# Patient Record
Sex: FEMALE | Race: WHITE | NOT HISPANIC OR LATINO
[De-identification: names, ages, dates, MRNs, and addresses within clinical notes are randomized per-mention and may not be internally consistent; named-entity substitution may affect disease eponyms.]

---

## 2020-07-06 ENCOUNTER — DASHBOARD ENCOUNTERS (OUTPATIENT)
Age: 50
End: 2020-07-06

## 2020-07-06 ENCOUNTER — OFFICE VISIT (OUTPATIENT)
Dept: URBAN - METROPOLITAN AREA CLINIC 40 | Facility: CLINIC | Age: 50
End: 2020-07-06
Payer: COMMERCIAL

## 2020-07-06 ENCOUNTER — LAB OUTSIDE AN ENCOUNTER (OUTPATIENT)
Dept: URBAN - METROPOLITAN AREA CLINIC 40 | Facility: CLINIC | Age: 50
End: 2020-07-06

## 2020-07-06 DIAGNOSIS — R10.13 DYSPEPSIA: ICD-10-CM

## 2020-07-06 DIAGNOSIS — R10.12 LUQ PAIN: ICD-10-CM

## 2020-07-06 PROCEDURE — G8427 DOCREV CUR MEDS BY ELIG CLIN: HCPCS | Performed by: INTERNAL MEDICINE

## 2020-07-06 PROCEDURE — G8417 CALC BMI ABV UP PARAM F/U: HCPCS | Performed by: INTERNAL MEDICINE

## 2020-07-06 PROCEDURE — G9903 PT SCRN TBCO ID AS NON USER: HCPCS | Performed by: INTERNAL MEDICINE

## 2020-07-06 PROCEDURE — 99214 OFFICE O/P EST MOD 30 MIN: CPT | Performed by: INTERNAL MEDICINE

## 2020-07-06 PROCEDURE — 3017F COLORECTAL CA SCREEN DOC REV: CPT | Performed by: INTERNAL MEDICINE

## 2020-07-06 RX ORDER — MELOXICAM 7.5 MG/1
1 TABLET TABLET ORAL ONCE A DAY
Status: ACTIVE | COMMUNITY

## 2020-07-06 RX ORDER — MELOXICAM 7.5 MG/1
1 TABLET TABLET ORAL ONCE A DAY
OUTPATIENT

## 2020-07-06 RX ORDER — SUCRALFATE 1 G/1
1 TABLET ON AN EMPTY STOMACH TABLET ORAL TWICE A DAY
Qty: 180 TABLET | Refills: 1 | OUTPATIENT
Start: 2020-07-06 | End: 2021-01-01

## 2020-07-06 RX ORDER — PANTOPRAZOLE SODIUM 40 MG/1
TAKE 1 TABLET BY MOUTH EVERY DAY TABLET, DELAYED RELEASE ORAL
Qty: 30 | Refills: 0 | Status: DISCONTINUED | COMMUNITY
Start: 2020-01-07

## 2020-07-06 RX ORDER — ERGOCALCIFEROL 1.25 MG/1
CAPSULE ORAL
Qty: 0 | Refills: 0 | Status: DISCONTINUED | COMMUNITY
Start: 1900-01-01

## 2020-07-06 RX ORDER — GAUZE BANDAGE 4" X 4"
1 CAPSULE BANDAGE TOPICAL ONCE A DAY
Status: ACTIVE | COMMUNITY

## 2020-07-06 RX ORDER — CHROMIUM 200 MCG
TABLET ORAL
Qty: 0 | Refills: 0 | Status: ACTIVE | COMMUNITY
Start: 1900-01-01

## 2020-07-06 RX ORDER — PANTOPRAZOLE SODIUM 40 MG/1
1 TABLET TABLET, DELAYED RELEASE ORAL ONCE A DAY
Qty: 90 | Refills: 0 | OUTPATIENT
Start: 2020-07-06

## 2020-07-06 NOTE — HPI-TODAY'S VISIT:
Ms. Diaz presents for follow-up.  She was last seen July 2019.  At that point she was doing fairly well on pantoprazole for acid reflux.  Patient presents stating for the last 7 days she has had burning pain in the left upper quadrant.  The pain is worse after she eats and drinks.  She also notes nausea and increased burping after meals.  She has been using aloe vera and honey and this has

## 2020-07-06 NOTE — PHYSICAL EXAM GASTROINTESTINAL
Abdomen , soft, mild upper,abdominal tenderness bilaterally nondistended , no guarding or rigidity , no masses palpable , normal bowel sounds , Liver and Spleen , no hepatomegaly present , no hepatosplenomegaly , liver nontender , spleen not palpable

## 2020-07-09 ENCOUNTER — OFFICE VISIT (OUTPATIENT)
Dept: URBAN - METROPOLITAN AREA SURGERY CENTER 30 | Facility: SURGERY CENTER | Age: 50
End: 2020-07-09
Payer: COMMERCIAL

## 2020-07-09 DIAGNOSIS — K22.8 COLUMNAR-LINED ESOPHAGUS: ICD-10-CM

## 2020-07-09 DIAGNOSIS — K29.30 CHRONIC SUPERFICIAL GASTRITIS: ICD-10-CM

## 2020-07-09 PROCEDURE — 43239 EGD BIOPSY SINGLE/MULTIPLE: CPT | Performed by: INTERNAL MEDICINE

## 2020-07-09 PROCEDURE — G8907 PT DOC NO EVENTS ON DISCHARG: HCPCS | Performed by: INTERNAL MEDICINE

## 2020-07-22 ENCOUNTER — OFFICE VISIT (OUTPATIENT)
Dept: URBAN - METROPOLITAN AREA CLINIC 40 | Facility: CLINIC | Age: 50
End: 2020-07-22

## 2020-08-19 ENCOUNTER — ERX REFILL RESPONSE (OUTPATIENT)
Dept: URBAN - METROPOLITAN AREA CLINIC 40 | Facility: CLINIC | Age: 50
End: 2020-08-19

## 2020-08-19 RX ORDER — PANTOPRAZOLE SODIUM 40 MG/1
1 TABLET TABLET, DELAYED RELEASE ORAL ONCE A DAY
Qty: 90 | Refills: 0

## 2020-11-23 ENCOUNTER — ERX REFILL RESPONSE (OUTPATIENT)
Dept: URBAN - METROPOLITAN AREA CLINIC 40 | Facility: CLINIC | Age: 50
End: 2020-11-23

## 2020-11-23 RX ORDER — PANTOPRAZOLE SODIUM 40 MG/1
1 TABLET TABLET, DELAYED RELEASE ORAL ONCE A DAY
Qty: 30 | Refills: 1

## 2020-12-31 ENCOUNTER — ERX REFILL RESPONSE (OUTPATIENT)
Dept: URBAN - METROPOLITAN AREA CLINIC 40 | Facility: CLINIC | Age: 50
End: 2020-12-31

## 2020-12-31 RX ORDER — PANTOPRAZOLE SODIUM 40 MG/1
1 TABLET TABLET, DELAYED RELEASE ORAL ONCE A DAY
Qty: 30 | Refills: 0

## 2021-01-04 ENCOUNTER — ERX REFILL RESPONSE (OUTPATIENT)
Dept: URBAN - METROPOLITAN AREA CLINIC 40 | Facility: CLINIC | Age: 51
End: 2021-01-04

## 2021-01-04 RX ORDER — SUCRALFATE 1 G/1
1 TABLET ON AN EMPTY STOMACH TABLET ORAL TWICE A DAY
Qty: 60 | Refills: 0

## 2024-05-06 ENCOUNTER — P2P PATIENT RECORD (OUTPATIENT)
Age: 54
End: 2024-05-06

## 2024-05-16 ENCOUNTER — WEB ENCOUNTER (OUTPATIENT)
Dept: URBAN - METROPOLITAN AREA CLINIC 40 | Facility: CLINIC | Age: 54
End: 2024-05-16

## 2024-05-29 ENCOUNTER — WEB ENCOUNTER (OUTPATIENT)
Dept: URBAN - METROPOLITAN AREA CLINIC 40 | Facility: CLINIC | Age: 54
End: 2024-05-29

## 2024-06-12 ENCOUNTER — OUT OF OFFICE VISIT (OUTPATIENT)
Dept: URBAN - METROPOLITAN AREA SURGERY CENTER 30 | Facility: SURGERY CENTER | Age: 54
End: 2024-06-12
Payer: COMMERCIAL

## 2024-06-12 DIAGNOSIS — D12.7 ADENOMA DETERMINED BY COLORECTAL BIOPSY: ICD-10-CM

## 2024-06-12 DIAGNOSIS — D12.5 ADENOMA OF SIGMOID COLON: ICD-10-CM

## 2024-06-12 DIAGNOSIS — Z86.010 ADENOMAS PERSONAL HISTORY OF COLONIC POLYPS: ICD-10-CM

## 2024-06-12 DIAGNOSIS — Z09 CARDIOLOGY FOLLOW-UP ENCOUNTER: ICD-10-CM

## 2024-06-12 DIAGNOSIS — F41.9 ACUTE ANXIETY: ICD-10-CM

## 2024-06-12 PROCEDURE — 45385 COLONOSCOPY W/LESION REMOVAL: CPT | Performed by: INTERNAL MEDICINE

## 2024-06-12 PROCEDURE — 00811 ANES LWR INTST NDSC NOS: CPT | Performed by: NURSE ANESTHETIST, CERTIFIED REGISTERED

## 2024-09-09 PROBLEM — 197321007: Status: ACTIVE | Noted: 2024-09-09

## 2024-09-09 PROBLEM — 8493009: Status: ACTIVE | Noted: 2024-09-09

## 2024-09-20 ENCOUNTER — LAB OUTSIDE AN ENCOUNTER (OUTPATIENT)
Dept: URBAN - METROPOLITAN AREA CLINIC 74 | Facility: CLINIC | Age: 54
End: 2024-09-20

## 2024-09-20 ENCOUNTER — OFFICE VISIT (OUTPATIENT)
Dept: URBAN - METROPOLITAN AREA CLINIC 74 | Facility: CLINIC | Age: 54
End: 2024-09-20
Payer: COMMERCIAL

## 2024-09-20 VITALS
HEIGHT: 62 IN | BODY MASS INDEX: 35.55 KG/M2 | TEMPERATURE: 97.7 F | WEIGHT: 193.2 LBS | DIASTOLIC BLOOD PRESSURE: 86 MMHG | SYSTOLIC BLOOD PRESSURE: 126 MMHG | HEART RATE: 84 BPM

## 2024-09-20 DIAGNOSIS — R10.11 RUQ ABDOMINAL PAIN: ICD-10-CM

## 2024-09-20 DIAGNOSIS — Z86.010 PERSONAL HISTORY OF COLONIC POLYPS: ICD-10-CM

## 2024-09-20 DIAGNOSIS — R14.0 BLOATING SYMPTOM: ICD-10-CM

## 2024-09-20 DIAGNOSIS — M94.0 ACUTE COSTOCHONDRITIS: ICD-10-CM

## 2024-09-20 DIAGNOSIS — K29.50 MILD CHRONIC GASTRITIS: ICD-10-CM

## 2024-09-20 DIAGNOSIS — R74.01 TRANSAMINITIS: ICD-10-CM

## 2024-09-20 DIAGNOSIS — K44.9 HIATAL HERNIA: ICD-10-CM

## 2024-09-20 DIAGNOSIS — K76.0 HEPATIC STEATOSIS: ICD-10-CM

## 2024-09-20 PROBLEM — 428283002: Status: ACTIVE | Noted: 2024-09-20

## 2024-09-20 PROCEDURE — 99214 OFFICE O/P EST MOD 30 MIN: CPT | Performed by: PHYSICIAN ASSISTANT

## 2024-09-20 RX ORDER — CHROMIUM 200 MCG
TABLET ORAL
Qty: 0 | Refills: 0 | Status: ON HOLD | COMMUNITY
Start: 1900-01-01

## 2024-09-20 RX ORDER — DICYCLOMINE HYDROCHLORIDE 20 MG/1
1 TABLET TABLET ORAL THREE TIMES A DAY
Qty: 90 | Refills: 3 | OUTPATIENT
Start: 2024-09-20 | End: 2025-01-17

## 2024-09-20 RX ORDER — GAUZE BANDAGE 4" X 4"
1 CAPSULE BANDAGE TOPICAL ONCE A DAY
Status: ON HOLD | COMMUNITY

## 2024-09-20 RX ORDER — PANTOPRAZOLE SODIUM 40 MG/1
1 TABLET TABLET, DELAYED RELEASE ORAL ONCE A DAY
Qty: 30 | Refills: 0 | Status: ON HOLD | COMMUNITY

## 2024-09-20 RX ORDER — SUCRALFATE 1 G/1
1 TABLET ON AN EMPTY STOMACH TABLET ORAL TWICE A DAY
Qty: 60 | Refills: 0 | Status: ACTIVE | COMMUNITY

## 2024-09-20 NOTE — HPI-TODAY'S VISIT:
The patient is 54-year-old female with past medical history as noted below known to Dr. Cobb is presenting to our clinic today with right flank and abdominal pain. She has RUQ abdominal pain for 3 months not asociated with nausea, vomiting, or change in bowel movement. Her pain changes with deep breathing and movements.   Diagnostic studies: -- CT scan of the abdomen and pelvis without IV contrast on 09/04/2024 noted hepatic asteatosis otherwise unremarkable study.  -- RUQ US on 08/12/2024 noted echogenic liver parenchyma suggesting underlying hepatic steatosis otherwise unremarkable study.  -- Labs on 08/28/2024 CMP with glucose 173, BUN 20, creatinine 0.7, , AST 37, ALT 64, and TB 0.5.  CBC with WBC 7.0, hemoglobin 17.1, hematocrit 50.2, and platelet 252.  Lipid panel with cholesterol 247, triglyceride 213, HDL 57, and .  Hemoglobin A1c 8.5.  TSH 1.60.  Procedures: -- Colonoscopy with polypectomy on 06/12/2024 by Dr. Cobb noted medium sized lipoma at the hepatic flexure. One 2 mm polyp in the sigmoid colon, removed with a cold biopsy forcep and removed with a cold snare.  Resected and retrieved. One 4 mm polyp at the rectosigmoid colon, removed with a cold snare.  Resected and retrieved.  Non-bleeding internal hemorrhoids.  Biopsy with tubular adenoma colon polyps repeat colonoscopy in 7 years for surveillance.  -- EGD with biopsy on 07/09/2020 by Dr. Cobb noted Z-line variable, 36 cm from incisors.  Small hiatal hernia.  Gastritis.  No gross lesion entire examined duodenum.  Biopsy duodenum with no significant histopathology.  No celiac's sprue noted.  Stomach with chronic active gastritis.  No H.pylori organism.  Esophagus with no significant abnormality.  No Lui's esophagus or eosinophilic esophagitis.

## 2024-09-20 NOTE — PHYSICAL EXAM CHEST:
chest wall non-tender, breathing is unlabored without accessory muscle use, normal breath sounds, right side costochondritis with tenderness over floating ribs

## 2024-10-11 ENCOUNTER — TELEPHONE ENCOUNTER (OUTPATIENT)
Dept: URBAN - METROPOLITAN AREA CLINIC 74 | Facility: CLINIC | Age: 54
End: 2024-10-11

## 2024-10-17 ENCOUNTER — OFFICE VISIT (OUTPATIENT)
Dept: URBAN - METROPOLITAN AREA CLINIC 74 | Facility: CLINIC | Age: 54
End: 2024-10-17
Payer: COMMERCIAL

## 2024-10-17 VITALS
OXYGEN SATURATION: 97 % | WEIGHT: 194.4 LBS | HEIGHT: 62 IN | BODY MASS INDEX: 35.77 KG/M2 | HEART RATE: 74 BPM | TEMPERATURE: 98.1 F | SYSTOLIC BLOOD PRESSURE: 126 MMHG | DIASTOLIC BLOOD PRESSURE: 84 MMHG

## 2024-10-17 DIAGNOSIS — K29.50 MILD CHRONIC GASTRITIS: ICD-10-CM

## 2024-10-17 DIAGNOSIS — K44.9 HIATAL HERNIA: ICD-10-CM

## 2024-10-17 DIAGNOSIS — E83.110 HEREDITARY HEMOCHROMATOSIS: ICD-10-CM

## 2024-10-17 DIAGNOSIS — K76.0 HEPATIC STEATOSIS: ICD-10-CM

## 2024-10-17 DIAGNOSIS — R74.01 TRANSAMINITIS: ICD-10-CM

## 2024-10-17 PROBLEM — 35400008: Status: ACTIVE | Noted: 2024-10-17

## 2024-10-17 PROCEDURE — 99213 OFFICE O/P EST LOW 20 MIN: CPT | Performed by: PHYSICIAN ASSISTANT

## 2024-10-17 RX ORDER — SUCRALFATE 1 G/1
1 TABLET ON AN EMPTY STOMACH TABLET ORAL TWICE A DAY
Qty: 60 | Refills: 0 | Status: ON HOLD | COMMUNITY

## 2024-10-17 RX ORDER — TRAZODONE HYDROCHLORIDE 50 MG/1
1 TABLET AT BEDTIME AS NEEDED TABLET ORAL ONCE A DAY
Status: ACTIVE | COMMUNITY

## 2024-10-17 RX ORDER — DICYCLOMINE HYDROCHLORIDE 20 MG/1
1 TABLET TABLET ORAL THREE TIMES A DAY
Qty: 90 | Refills: 3 | Status: ACTIVE | COMMUNITY
Start: 2024-09-20 | End: 2025-01-17

## 2024-10-17 NOTE — HPI-TODAY'S VISIT:
The patient is 54-year-old female with past medical history as noted below known to Dr. Cobb is presenting to our clinic today to discuss recent labs and imaging. She was given Dicyclomine 20 mg one tablet every 8 hours as needed. The patient was informed of her HIDA results and she was referred to Dr. Lorenzana on 10/11/2024. She saw Dr. Lorenzana on 10/16/2024 and she is scheduled for Gallbladder surgery on 10/21/2024. She is overall doing the same with some improvement on Dicyclomine. No other GI issues today.    Diagnsotic studies: -- HIDA scan on 10/11/2024 with abnormal gallbladder emptying study. Findings are consistent with either chronic cholecystitis or dyskinesia.  -- Labs on 10/01/2024 ASMA negative.  AMA negative.  LKM-1 antibody negative.  SLA autoantibody negative.  Alpha 1 antitrypsin 136.  Ceruloplasmin 29.  IgG G, IgM, and IgA unremarkable.  ANGELA negative.  Acute hepatitis panel undetectable. Fe panel with Fe 127, TIBC 388, % saturation 33, and Ferritin 86.  CMP with BUN 21, creatinine 0.68, ALP 98, AST 25, and ALT 49. FIB-4 INDEX 0.75. HHMA positive for one mutation H63D.  -- CT scan of the abdomen and pelvis without IV contrast on 09/04/2024 noted hepatic asteatosis otherwise unremarkable study.  -- RUQ US on 08/12/2024 noted echogenic liver parenchyma suggesting underlying hepatic steatosis otherwise unremarkable study.  -- Labs on 08/28/2024 CMP with glucose 173, BUN 20, creatinine 0.7, , AST 37, ALT 64, and TB 0.5.  CBC with WBC 7.0, hemoglobin 17.1, hematocrit 50.2, and platelet 252.  Lipid panel with cholesterol 247, triglyceride 213, HDL 57, and .  Hemoglobin A1c 8.5.  TSH 1.60.  Procedures: -- Colonoscopy with polypectomy on 06/12/2024 by Dr. Cobb noted medium sized lipoma at the hepatic flexure. One 2 mm polyp in the sigmoid colon, removed with a cold biopsy forcep and removed with a cold snare.  Resected and retrieved. One 4 mm polyp at the rectosigmoid colon, removed with a cold snare.  Resected and retrieved.  Non-bleeding internal hemorrhoids.  Biopsy with tubular adenoma colon polyps repeat colonoscopy in 7 years for surveillance.  -- EGD with biopsy on 07/09/2020 by Dr. Cobb noted Z-line variable, 36 cm from incisors.  Small hiatal hernia.  Gastritis.  No gross lesion entire examined duodenum.  Biopsy duodenum with no significant histopathology.  No celiac's sprue noted.  Stomach with chronic active gastritis.  No H.pylori organism.  Esophagus with no significant abnormality.  No Lui's esophagus or eosinophilic esophagitis.

## 2025-01-10 ENCOUNTER — OFFICE VISIT (OUTPATIENT)
Dept: URBAN - METROPOLITAN AREA TELEHEALTH 2 | Facility: TELEHEALTH | Age: 55
End: 2025-01-10
Payer: COMMERCIAL

## 2025-01-10 ENCOUNTER — TELEPHONE ENCOUNTER (OUTPATIENT)
Dept: URBAN - METROPOLITAN AREA CLINIC 74 | Facility: CLINIC | Age: 55
End: 2025-01-10

## 2025-01-10 DIAGNOSIS — K29.50 MILD CHRONIC GASTRITIS: ICD-10-CM

## 2025-01-10 DIAGNOSIS — R14.0 BLOATING SYMPTOM: ICD-10-CM

## 2025-01-10 DIAGNOSIS — K76.0 HEPATIC STEATOSIS: ICD-10-CM

## 2025-01-10 DIAGNOSIS — K44.9 HIATAL HERNIA: ICD-10-CM

## 2025-01-10 DIAGNOSIS — R74.01 TRANSAMINITIS: ICD-10-CM

## 2025-01-10 PROCEDURE — 99212 OFFICE O/P EST SF 10 MIN: CPT | Performed by: PHYSICIAN ASSISTANT

## 2025-01-10 PROCEDURE — 99442 PHONE E/M BY PHYS 11-20 MIN: CPT | Performed by: PHYSICIAN ASSISTANT

## 2025-01-10 RX ORDER — DICYCLOMINE HYDROCHLORIDE 20 MG/1
1 TABLET TABLET ORAL THREE TIMES A DAY
Qty: 270 TABLET | Refills: 3
Start: 2024-09-20 | End: 2026-01-05

## 2025-01-10 RX ORDER — SUCRALFATE 1 G/1
1 TABLET ON AN EMPTY STOMACH TABLET ORAL TWICE A DAY
Qty: 60 | Refills: 0 | Status: ON HOLD | COMMUNITY

## 2025-01-10 RX ORDER — TRAZODONE HYDROCHLORIDE 50 MG/1
1 TABLET AT BEDTIME AS NEEDED TABLET ORAL ONCE A DAY
Status: ACTIVE | COMMUNITY

## 2025-01-10 RX ORDER — DICYCLOMINE HYDROCHLORIDE 20 MG/1
1 TABLET TABLET ORAL THREE TIMES A DAY
Qty: 90 | Refills: 3 | Status: ACTIVE | COMMUNITY
Start: 2024-09-20 | End: 2025-01-17

## 2025-01-10 NOTE — HPI-TODAY'S VISIT:
The patient is 54-year-old female with past medical history as noted below known to Dr. Cobb has telemedicine appointment today for routine follow up appointment. She continues on Dicyclomine 20 mg one tablet every 8 hours as needed. She is S/P Gallbladder surgery in 10/2024. She is doing overall much better since her surgery. She denies any new GI issues except abdominal bloating and gas.   Diagnsotic studies: -- HIDA scan on 10/11/2024 with abnormal gallbladder emptying study. Findings are consistent with either chronic cholecystitis or dyskinesia.  -- Labs on 10/01/2024 ASMA negative.  AMA negative.  LKM-1 antibody negative.  SLA autoantibody negative.  Alpha 1 antitrypsin 136.  Ceruloplasmin 29.  IgG G, IgM, and IgA unremarkable.  ANGELA negative.  Acute hepatitis panel undetectable. Fe panel with Fe 127, TIBC 388, % saturation 33, and Ferritin 86.  CMP with BUN 21, creatinine 0.68, ALP 98, AST 25, and ALT 49. FIB-4 INDEX 0.75. HHMA positive for one mutation H63D.  -- CT scan of the abdomen and pelvis without IV contrast on 09/04/2024 noted hepatic asteatosis otherwise unremarkable study.  -- RUQ US on 08/12/2024 noted echogenic liver parenchyma suggesting underlying hepatic steatosis otherwise unremarkable study.  -- Labs on 08/28/2024 CMP with glucose 173, BUN 20, creatinine 0.7, , AST 37, ALT 64, and TB 0.5.  CBC with WBC 7.0, hemoglobin 17.1, hematocrit 50.2, and platelet 252.  Lipid panel with cholesterol 247, triglyceride 213, HDL 57, and .  Hemoglobin A1c 8.5.  TSH 1.60.  Procedures: -- Colonoscopy with polypectomy on 06/12/2024 by Dr. Cobb noted medium sized lipoma at the hepatic flexure. One 2 mm polyp in the sigmoid colon, removed with a cold biopsy forcep and removed with a cold snare.  Resected and retrieved. One 4 mm polyp at the rectosigmoid colon, removed with a cold snare.  Resected and retrieved.  Non-bleeding internal hemorrhoids.  Biopsy with tubular adenoma colon polyps repeat colonoscopy in 7 years for surveillance.  -- EGD with biopsy on 07/09/2020 by Dr. Cobb noted Z-line variable, 36 cm from incisors.  Small hiatal hernia.  Gastritis.  No gross lesion entire examined duodenum.  Biopsy duodenum with no significant histopathology.  No celiac's sprue noted.  Stomach with chronic active gastritis.  No H.pylori organism.  Esophagus with no significant abnormality.  No Lui's esophagus or eosinophilic esophagitis.

## 2025-01-16 ENCOUNTER — OFFICE VISIT (OUTPATIENT)
Dept: URBAN - METROPOLITAN AREA CLINIC 74 | Facility: CLINIC | Age: 55
End: 2025-01-16

## 2025-01-17 ENCOUNTER — OFFICE VISIT (OUTPATIENT)
Dept: URBAN - METROPOLITAN AREA CLINIC 74 | Facility: CLINIC | Age: 55
End: 2025-01-17

## 2025-01-21 ENCOUNTER — WEB ENCOUNTER (OUTPATIENT)
Dept: URBAN - METROPOLITAN AREA CLINIC 74 | Facility: CLINIC | Age: 55
End: 2025-01-21

## 2025-03-13 ENCOUNTER — OFFICE VISIT (OUTPATIENT)
Dept: URBAN - METROPOLITAN AREA CLINIC 74 | Facility: CLINIC | Age: 55
End: 2025-03-13

## 2025-03-13 RX ORDER — DICYCLOMINE HYDROCHLORIDE 20 MG/1
1 TABLET TABLET ORAL THREE TIMES A DAY
Qty: 270 TABLET | Refills: 3 | Status: ACTIVE | COMMUNITY
Start: 2024-09-20 | End: 2026-01-05

## 2025-03-13 RX ORDER — TRAZODONE HYDROCHLORIDE 50 MG/1
1 TABLET AT BEDTIME AS NEEDED TABLET ORAL ONCE A DAY
Status: ACTIVE | COMMUNITY

## 2025-03-13 RX ORDER — SUCRALFATE 1 G/1
1 TABLET ON AN EMPTY STOMACH TABLET ORAL TWICE A DAY
Qty: 60 | Refills: 0 | Status: ON HOLD | COMMUNITY

## 2025-03-13 NOTE — HPI-TODAY'S VISIT:
The patient is 54-year-old female with past medical history as noted below known to Dr. Cobb is presenting to our clinic today for routine follow up appointment. Did not complete SIBO test.  She continues on Dicyclomine 20 mg one tablet every 8 hours as needed.   Diagnsotic studies: -- Labs on 10/01/2024 ASMA negative.  AMA negative.  LKM-1 antibody negative.  SLA autoantibody negative.  Alpha 1 antitrypsin 136.  Ceruloplasmin 29.  IgG G, IgM, and IgA unremarkable.  ANGELA negative.  Acute hepatitis panel undetectable. Fe panel with Fe 127, TIBC 388, % saturation 33, and Ferritin 86.  CMP with BUN 21, creatinine 0.68, ALP 98, AST 25, and ALT 49. FIB-4 INDEX 0.75. HHMA positive for one mutation H63D.  -- CT scan of the abdomen and pelvis without IV contrast on 09/04/2024 noted hepatic steatosis otherwise unremarkable study.   Procedures: -- Colonoscopy with polypectomy on 06/12/2024 by Dr. Cobb noted medium sized lipoma at the hepatic flexure. One 2 mm polyp in the sigmoid colon, removed with a cold biopsy forcep and removed with a cold snare.  Resected and retrieved. One 4 mm polyp at the rectosigmoid colon, removed with a cold snare.  Resected and retrieved.  Non-bleeding internal hemorrhoids.  Biopsy with tubular adenoma colon polyps repeat colonoscopy in 7 years for surveillance.  -- EGD with biopsy on 07/09/2020 by Dr. Cobb noted Z-line variable, 36 cm from incisors.  Small hiatal hernia.  Gastritis.  No gross lesion entire examined duodenum.  Biopsy duodenum with no significant histopathology.  No celiac's sprue noted.  Stomach with chronic active gastritis.  No H.pylori organism.  Esophagus with no significant abnormality.  No Lui's esophagus or eosinophilic esophagitis.

## 2025-03-24 ENCOUNTER — OFFICE VISIT (OUTPATIENT)
Dept: URBAN - METROPOLITAN AREA CLINIC 74 | Facility: CLINIC | Age: 55
End: 2025-03-24

## 2025-03-24 RX ORDER — SUCRALFATE 1 G/1
1 TABLET ON AN EMPTY STOMACH TABLET ORAL TWICE A DAY
Qty: 60 | Refills: 0 | COMMUNITY

## 2025-03-24 RX ORDER — TRAZODONE HYDROCHLORIDE 50 MG/1
1 TABLET AT BEDTIME AS NEEDED TABLET ORAL ONCE A DAY
Status: ACTIVE | COMMUNITY

## 2025-03-24 RX ORDER — DICYCLOMINE HYDROCHLORIDE 20 MG/1
1 TABLET TABLET ORAL THREE TIMES A DAY
Qty: 270 TABLET | Refills: 3 | Status: ACTIVE | COMMUNITY
Start: 2024-09-20 | End: 2026-01-05

## 2025-03-24 NOTE — HPI-TODAY'S VISIT:
The patient is 54-year-old female with past medical history as noted below known to Dr. Cobb is presenting to our clinic today for routine follow up appointment. Did not complete SIBO test.  She continues on Dicyclomine 20 mg one tablet every 8 hours as needed.   Diagnsotic studies: -- SIBO test on 03/18/2025 undetectable.   -- Labs on 10/01/2024 ASMA negative.  AMA negative.  LKM-1 antibody negative.  SLA autoantibody negative.  Alpha 1 antitrypsin 136.  Ceruloplasmin 29.  IgG G, IgM, and IgA unremarkable.  ANGELA negative.  Acute hepatitis panel undetectable. Fe panel with Fe 127, TIBC 388, % saturation 33, and Ferritin 86.  CMP with BUN 21, creatinine 0.68, ALP 98, AST 25, and ALT 49. FIB-4 INDEX 0.75. HHMA positive for one mutation H63D.  -- CT scan of the abdomen and pelvis without IV contrast on 09/04/2024 noted hepatic steatosis otherwise unremarkable study.   Procedures: -- Colonoscopy with polypectomy on 06/12/2024 by Dr. Cobb noted medium sized lipoma at the hepatic flexure. One 2 mm polyp in the sigmoid colon, removed with a cold biopsy forcep and removed with a cold snare.  Resected and retrieved. One 4 mm polyp at the rectosigmoid colon, removed with a cold snare.  Resected and retrieved.  Non-bleeding internal hemorrhoids.  Biopsy with tubular adenoma colon polyps repeat colonoscopy in 7 years for surveillance.  -- EGD with biopsy on 07/09/2020 by Dr. Cobb noted Z-line variable, 36 cm from incisors.  Small hiatal hernia.  Gastritis.  No gross lesion entire examined duodenum.  Biopsy duodenum with no significant histopathology.  No celiac's sprue noted.  Stomach with chronic active gastritis.  No H.pylori organism.  Esophagus with no significant abnormality.  No Lui's esophagus or eosinophilic esophagitis.

## 2025-03-28 ENCOUNTER — OFFICE VISIT (OUTPATIENT)
Dept: URBAN - METROPOLITAN AREA CLINIC 74 | Facility: CLINIC | Age: 55
End: 2025-03-28
Payer: COMMERCIAL

## 2025-03-28 ENCOUNTER — LAB OUTSIDE AN ENCOUNTER (OUTPATIENT)
Dept: URBAN - METROPOLITAN AREA CLINIC 74 | Facility: CLINIC | Age: 55
End: 2025-03-28

## 2025-03-28 DIAGNOSIS — R74.01 TRANSAMINITIS: ICD-10-CM

## 2025-03-28 DIAGNOSIS — R14.0 BLOATING SYMPTOM: ICD-10-CM

## 2025-03-28 DIAGNOSIS — K76.0 HEPATIC STEATOSIS: ICD-10-CM

## 2025-03-28 DIAGNOSIS — Z79.899 LONG-TERM CURRENT USE OF PROTON PUMP INHIBITOR THERAPY: ICD-10-CM

## 2025-03-28 DIAGNOSIS — K29.50 MILD CHRONIC GASTRITIS: ICD-10-CM

## 2025-03-28 DIAGNOSIS — K21.00 CHRONIC REFLUX ESOPHAGITIS: ICD-10-CM

## 2025-03-28 DIAGNOSIS — R10.11 POSTPRANDIAL RUQ PAIN: ICD-10-CM

## 2025-03-28 PROBLEM — 266433003: Status: ACTIVE | Noted: 2025-03-28

## 2025-03-28 PROCEDURE — 99214 OFFICE O/P EST MOD 30 MIN: CPT | Performed by: PHYSICIAN ASSISTANT

## 2025-03-28 RX ORDER — DICYCLOMINE HYDROCHLORIDE 20 MG/1
1 TABLET TABLET ORAL THREE TIMES A DAY
Qty: 270 TABLET | Refills: 3 | Status: ACTIVE | COMMUNITY
Start: 2024-09-20 | End: 2026-01-05

## 2025-03-28 RX ORDER — TRAZODONE HYDROCHLORIDE 50 MG/1
1 TABLET AT BEDTIME AS NEEDED TABLET ORAL ONCE A DAY
Status: ACTIVE | COMMUNITY

## 2025-03-28 RX ORDER — PANTOPRAZOLE SODIUM 40 MG/1
1 TABLET 1/2 TO 1 HOUR BEFORE MORNING MEAL TABLET, DELAYED RELEASE ORAL ONCE A DAY
Qty: 90 TABLET | Refills: 1 | OUTPATIENT
Start: 2025-03-28

## 2025-03-28 RX ORDER — SUCRALFATE 1 G/1
1 TABLET ON AN EMPTY STOMACH TABLET ORAL TWICE A DAY
Qty: 60 | Refills: 0 | Status: ON HOLD | COMMUNITY

## 2025-03-28 RX ORDER — RIFAXIMIN 550 MG/1
1 TABLET TABLET ORAL THREE TIMES A DAY
Qty: 42 TABLET | Refills: 1 | OUTPATIENT
Start: 2025-03-28 | End: 2025-04-25

## 2025-03-28 NOTE — PHYSICAL EXAM GASTROINTESTINAL
Abdomen , soft, tender at RUQ  and epigastric abdominal pain, nondistended , no guarding or rigidity , no masses palpable , normal bowel sounds , Liver and Spleen,  no hepatosplenomegaly , liver nontender

## 2025-03-28 NOTE — HPI-TODAY'S VISIT:
The patient is 45-year-old female with past medical history as noted below known to Dr. Renetta Cobb is presenting to our clinic today for follow-up appointment with known history of fatty liver and transaminitis.  She is also complaining of persistent epigastric abdominal pain, right upper quadrant abdominal pain, postprandial pain, nausea, abdominal bloating and distention.  The patient is currently on Dicyclomine 20 mg tablet 1 tablet every 8 hours as needed.  She stated that Dicyclomine does help with some pain and discomfort but it has not improved her epigastric and right upper quadrant abdominal pain after meals.  She is currently on no medication with previous history of gastritis and esophagitis.  She denies to use nonsteroidal anti-inflammatory medication.  No changes in her medication.  She continues to be on Jardiance for diabetes and weight loss.  She is moving her bowels daily with no changes in her bowel habits.   Diagnostic studies: -- SIBO test on 03/18/2025 undetectable.   -- Labs on 10/01/2024 ASMA negative.  AMA negative.  LKM-1 antibody negative.  SLA autoantibody negative.  Alpha 1 antitrypsin 136.  Ceruloplasmin 29.  IgG G, IgM, and IgA unremarkable.  ANGELA negative.  Acute hepatitis panel undetectable. Fe panel with Fe 127, TIBC 388, % saturation 33, and Ferritin 86.  CMP with BUN 21, creatinine 0.68, ALP 98, AST 25, and ALT 49. FIB-4 INDEX 0.75. HHMA positive for one mutation H63D.  -- CT scan of the abdomen and pelvis without IV contrast on 09/04/2024 noted hepatic steatosis otherwise unremarkable study.   Procedures: -- Colonoscopy with polypectomy on 06/12/2024 by Dr. Cobb noted medium sized lipoma at the hepatic flexure. One 2 mm polyp in the sigmoid colon, removed with a cold biopsy forcep and removed with a cold snare.  Resected and retrieved. One 4 mm polyp at the rectosigmoid colon, removed with a cold snare.  Resected and retrieved.  Non-bleeding internal hemorrhoids.  Biopsy with tubular adenoma colon polyps repeat colonoscopy in 7 years for surveillance.  -- EGD with biopsy on 07/09/2020 by Dr. Cobb noted Z-line variable, 36 cm from incisors.  Small hiatal hernia.  Gastritis.  No gross lesion entire examined duodenum.  Biopsy duodenum with no significant histopathology.  No celiac's sprue noted.  Stomach with chronic active gastritis.  No H.pylori organism.  Esophagus with no significant abnormality.  No Lui's esophagus or eosinophilic esophagitis.

## 2025-04-04 ENCOUNTER — CLAIMS CREATED FROM THE CLAIM WINDOW (OUTPATIENT)
Dept: URBAN - METROPOLITAN AREA SURGERY CENTER 30 | Facility: SURGERY CENTER | Age: 55
End: 2025-04-04
Payer: COMMERCIAL

## 2025-04-04 ENCOUNTER — TELEPHONE ENCOUNTER (OUTPATIENT)
Dept: URBAN - METROPOLITAN AREA CLINIC 40 | Facility: CLINIC | Age: 55
End: 2025-04-04

## 2025-04-04 DIAGNOSIS — K31.89 OTHER DISEASES OF STOMACH AND DUODENUM: ICD-10-CM

## 2025-04-04 DIAGNOSIS — R10.13 DYSPEPSIA: ICD-10-CM

## 2025-04-04 DIAGNOSIS — K29.60 OTHER GASTRITIS WITHOUT BLEEDING: ICD-10-CM

## 2025-04-04 DIAGNOSIS — K29.70 CHRONIC GASTRITIS: ICD-10-CM

## 2025-04-04 DIAGNOSIS — K22.89 OTHER SPECIFIED DISEASE OF ESOPHAGUS: ICD-10-CM

## 2025-04-04 DIAGNOSIS — K22.89 OTHER SPECIFIED DISEASES OF ESOPHAGUS: ICD-10-CM

## 2025-04-04 PROCEDURE — 43239 EGD BIOPSY SINGLE/MULTIPLE: CPT | Performed by: INTERNAL MEDICINE

## 2025-04-04 PROCEDURE — 00731 ANES UPR GI NDSC PX NOS: CPT | Performed by: NURSE ANESTHETIST, CERTIFIED REGISTERED

## 2025-04-04 RX ORDER — TRAZODONE HYDROCHLORIDE 50 MG/1
1 TABLET AT BEDTIME AS NEEDED TABLET ORAL ONCE A DAY
Status: ACTIVE | COMMUNITY

## 2025-04-04 RX ORDER — DICYCLOMINE HYDROCHLORIDE 20 MG/1
1 TABLET TABLET ORAL THREE TIMES A DAY
Qty: 270 TABLET | Refills: 3 | Status: ACTIVE | COMMUNITY
Start: 2024-09-20 | End: 2026-01-05

## 2025-04-04 RX ORDER — SUCRALFATE 1 G/1
1 TABLET ON AN EMPTY STOMACH TABLET ORAL TWICE A DAY
Qty: 60 | Refills: 1

## 2025-04-04 RX ORDER — RIFAXIMIN 550 MG/1
1 TABLET TABLET ORAL THREE TIMES A DAY
Qty: 42 TABLET | Refills: 1 | Status: ACTIVE | COMMUNITY
Start: 2025-03-28 | End: 2025-04-25

## 2025-04-04 RX ORDER — SUCRALFATE 1 G/1
1 TABLET ON AN EMPTY STOMACH TABLET ORAL TWICE A DAY
Qty: 60 | Refills: 0 | Status: ON HOLD | COMMUNITY

## 2025-04-04 RX ORDER — PANTOPRAZOLE SODIUM 40 MG/1
1 TABLET 1/2 TO 1 HOUR BEFORE MORNING MEAL TABLET, DELAYED RELEASE ORAL ONCE A DAY
Qty: 90 TABLET | Refills: 1 | Status: ACTIVE | COMMUNITY
Start: 2025-03-28

## 2025-04-08 PROBLEM — 72007001: Status: ACTIVE | Noted: 2025-04-08

## 2025-05-02 ENCOUNTER — LAB OUTSIDE AN ENCOUNTER (OUTPATIENT)
Dept: URBAN - METROPOLITAN AREA CLINIC 74 | Facility: CLINIC | Age: 55
End: 2025-05-02

## 2025-05-02 ENCOUNTER — OFFICE VISIT (OUTPATIENT)
Dept: URBAN - METROPOLITAN AREA CLINIC 74 | Facility: CLINIC | Age: 55
End: 2025-05-02
Payer: COMMERCIAL

## 2025-05-02 DIAGNOSIS — R74.01 TRANSAMINITIS: ICD-10-CM

## 2025-05-02 DIAGNOSIS — K29.80 PEPTIC DUODENITIS: ICD-10-CM

## 2025-05-02 DIAGNOSIS — R14.0 ABDOMINAL BLOATING: ICD-10-CM

## 2025-05-02 DIAGNOSIS — K76.0 HEPATIC STEATOSIS: ICD-10-CM

## 2025-05-02 DIAGNOSIS — Z79.899 LONG-TERM CURRENT USE OF PROTON PUMP INHIBITOR THERAPY: ICD-10-CM

## 2025-05-02 DIAGNOSIS — K20.90 ESOPHAGITIS: ICD-10-CM

## 2025-05-02 DIAGNOSIS — K29.50 MILD CHRONIC GASTRITIS: ICD-10-CM

## 2025-05-02 PROCEDURE — 99214 OFFICE O/P EST MOD 30 MIN: CPT | Performed by: PHYSICIAN ASSISTANT

## 2025-05-02 RX ORDER — SUCRALFATE 1 G/1
1 TABLET ON AN EMPTY STOMACH TABLET ORAL TWICE A DAY
Qty: 60 | Refills: 1 | Status: ACTIVE | COMMUNITY

## 2025-05-02 RX ORDER — PANTOPRAZOLE SODIUM 40 MG/1
1 TABLET 1/2 TO 1 HOUR BEFORE MORNING MEAL TABLET, DELAYED RELEASE ORAL ONCE A DAY
Qty: 90 TABLET | Refills: 1 | OUTPATIENT
Start: 2025-04-08

## 2025-05-02 RX ORDER — DICYCLOMINE HYDROCHLORIDE 20 MG/1
1 TABLET TABLET ORAL THREE TIMES A DAY
Qty: 270 TABLET | Refills: 3 | Status: ACTIVE | COMMUNITY
Start: 2024-09-20 | End: 2026-01-05

## 2025-05-02 RX ORDER — TRAZODONE HYDROCHLORIDE 50 MG/1
1 TABLET AT BEDTIME AS NEEDED TABLET ORAL ONCE A DAY
Status: ACTIVE | COMMUNITY

## 2025-05-02 RX ORDER — SUCRALFATE 1 G/1
1 TABLET ON AN EMPTY STOMACH TABLET ORAL TWICE A DAY
Qty: 60 | Refills: 1

## 2025-05-02 RX ORDER — PANTOPRAZOLE SODIUM 40 MG/1
1 TABLET 1/2 TO 1 HOUR BEFORE MORNING MEAL TABLET, DELAYED RELEASE ORAL ONCE A DAY
Qty: 90 TABLET | Refills: 1 | Status: ACTIVE | COMMUNITY
Start: 2025-03-28

## 2025-05-02 NOTE — HPI-TODAY'S VISIT:
The patient is 54-year-old female with past medical history as noted below known to Dr. Renetta Cobb is presenting to our clinic today for follow-up appointment to discuss her recent labs, stool study, and EGD results. She continues on Dicyclomine 20 mg tablet 1 tablet every 8 hours as needed, Pantoprazole 40 mg once daily, and Carafate 1 g every 12 hours. She was not able to completed smaples of Xifaxan 550 mg three times daily for 10-14 days therapy due to lack of insurance. She is doing overall much better. She is following her diet and she haslost 4 lbs.   Diagnostic studies: -- Labs on 03/28/2025 CMP with glucose 190, BUN 16, creatinine 0.7, , AST 64, , and TB 0.7.  Amylase 43.  Lipase 34.  GGT 41. FIB-4 INDEX 1.22.   -- Stool study for Pancreatic elastase 718 with unremarkable results.  -- SIBP test on 03/06/2025 was negative.  -- CT scan of the abdomen and pelvis without IV contrast on 09/04/2024 noted hepatic steatosis otherwise unremarkable study.  Procedures: -- EGD with biopsy with Dr. Renetta Cobb on 04/04/2025 noted Z-line irregular, 34 cm from the incisors.  Gastritis characterized by congestion and erythema.  Erythematous duodenopathy.  Biopsy duodenum with focal foveolar metaplasia suggestive of peptic injury.  Stomach with mild chronic inactive gastritis.  No H.pylori organism or intestinal metaplasia.  Esophagus with chronic inflammation.  No Lui's esophagus or eosinophilic esophagitis.  -- Colonoscopy with polypectomy on 06/12/2024 by Dr. Cobb noted medium sized lipoma at the hepatic flexure. One 2 mm polyp in the sigmoid colon, removed with a cold biopsy forcep and removed with a cold snare.  Resected and retrieved. One 4 mm polyp at the rectosigmoid colon, removed with a cold snare.  Resected and retrieved.  Non-bleeding internal hemorrhoids.  Biopsy with tubular adenoma colon polyps repeat colonoscopy in 7 years for surveillance.
Statement Selected

## 2025-05-13 ENCOUNTER — WEB ENCOUNTER (OUTPATIENT)
Dept: URBAN - METROPOLITAN AREA CLINIC 74 | Facility: CLINIC | Age: 55
End: 2025-05-13

## 2025-05-16 ENCOUNTER — OFFICE VISIT (OUTPATIENT)
Dept: URBAN - METROPOLITAN AREA CLINIC 97 | Facility: CLINIC | Age: 55
End: 2025-05-16